# Patient Record
Sex: MALE | URBAN - METROPOLITAN AREA
[De-identification: names, ages, dates, MRNs, and addresses within clinical notes are randomized per-mention and may not be internally consistent; named-entity substitution may affect disease eponyms.]

---

## 2020-09-17 ENCOUNTER — EMERGENCY (EMERGENCY)
Facility: HOSPITAL | Age: 25
LOS: 1 days | Discharge: HOME | End: 2020-09-17
Attending: EMERGENCY MEDICINE | Admitting: EMERGENCY MEDICINE
Payer: MEDICAID

## 2020-09-17 VITALS
RESPIRATION RATE: 16 BRPM | OXYGEN SATURATION: 100 % | HEART RATE: 72 BPM | SYSTOLIC BLOOD PRESSURE: 123 MMHG | DIASTOLIC BLOOD PRESSURE: 84 MMHG | TEMPERATURE: 99 F

## 2020-09-17 PROCEDURE — 99283 EMERGENCY DEPT VISIT LOW MDM: CPT

## 2020-09-17 NOTE — ED PROVIDER NOTE - CHPI ED SYMPTOMS NEG
no ever, chills, diff swallowing, drooling, CP, SOB, cough, abd pain, nausea, vomiting, diarrhea, body aches.

## 2020-09-17 NOTE — ED ADULT NURSE NOTE - OBJECTIVE STATEMENT
Pt presents to rm 3, alert&orientedx3, ambulatory, denies PMHX, coming to ED with c/o sore throat x1day. Pt states he picked up his family member from the airport coming from [place] and currently staying with pt. Pt states family member was tested positive for covid which prompted pt to come in to be tested. Pt appears in no acute distress, speaking in full and complete sentences, maintaining SpO2 of 100% and is breathing even and non-labored, non-diaphoretic. Pt denies any n/v/d, fevers, cough, or chest pain or SOB. Awaiting dispo

## 2020-09-17 NOTE — ED PROVIDER NOTE - CLINICAL SUMMARY MEDICAL DECISION MAKING FREE TEXT BOX
24 y/o M, no PMH, presents with exposure to cousin yesterday and today who tested positive for COVID. Pt is c/o sore throat for 1 day. Plan to do COVID swab. Given instructions to self quarantine for 14 days given exposure. Follow up with primary care if new symptoms develop. 26 y/o M, no PMH, presents with exposure to cousin yesterday and today who tested positive for COVID. Pt is c/o sore throat for 1 day. exam unremarkable. Plan to do COVID swab. Given instructions to self quarantine for 14 days given exposure. Follow up with primary care if new symptoms develop.

## 2020-09-17 NOTE — ED PROVIDER NOTE - ATTENDING CONTRIBUTION TO CARE
I performed a history and physical exam of the patient and discussed their management with the PA. I reviewed the PA's note and agree with the documented findings and plan of care. I have edited as appropriate. My medical decision making and observations are found above.  NAD, non labored breathing

## 2020-09-17 NOTE — ED PROVIDER NOTE - ENMT, MLM
Airway patent, Nasal mucosa clear. Mouth with normal mucosa. Throat has no vesicles, no oropharyngeal exudates and uvula is midline. Airway patent, Nasal mucosa clear. Mouth with normal mucosa. Throat has no vesicles, no oropharyngeal exudates and uvula is midline. No LAD. No erythema.

## 2020-09-17 NOTE — ED PROVIDER NOTE - OBJECTIVE STATEMENT
26 y/o M, no PMH, presents to ED c/o sore throat that started today. Pt picked up cousin yesterday from the airport. Pt's cousin had just got back from [place] and is currently living with the pt. Pt's cousin has tested positive for COVID at [facility] today. Pt is currently requesting a test. Pt denies any fever, chills, diff swallowing, drooling, CP, SOB, cough, abd pain, nausea, vomiting, diarrhea, body aches.

## 2020-09-17 NOTE — ED PROVIDER NOTE - PATIENT PORTAL LINK FT
You can access the FollowMyHealth Patient Portal offered by [facility] by registering at the following website: [email or web address]. By joining [email or web address]’s FollowMyHealth portal, you will also be able to view your health information using other applications (apps) compatible with our system.

## 2020-09-17 NOTE — ED PROVIDER NOTE - NSFOLLOWUPINSTRUCTIONS_ED_ALL_ED_FT
-- Please avoid contact with others while you are symptomatic and ideally two weeks.  -- This is likely a virus.  There is no direct treatment for a virus, and antibiotics are not effective.     -- Rest, increase your fluid intake and avoid dehydration.  -- Take Tylenol for fever of 100.4 or greater   -- It is very important to be diligent about hand washing & hygiene to avoid spreading the illness to others.  -- If you are having any worsening shortness of breath, please see your doctor or return to the Emergency Department.  -- Please continue taking your home medications as directed.     What should I do now?    If you are well enough to be discharged home and are not in a high risk group to have contracted the COVID-19, you should care for yourself at home exactly like you would if you have Influenza “flu”. Follow all the standard guidelines about washing your hands, covering your cough, etc. You should return to the Emergency Department if you develop worse symptoms, trouble breathing, chest pain, and/or a fever that doesn’t improve with over the counter acetaminophen.

## 2020-09-18 LAB — SARS-COV-2 RNA SPEC QL NAA+PROBE: SIGNIFICANT CHANGE UP
